# Patient Record
Sex: MALE
[De-identification: names, ages, dates, MRNs, and addresses within clinical notes are randomized per-mention and may not be internally consistent; named-entity substitution may affect disease eponyms.]

---

## 2020-10-05 ENCOUNTER — HOSPITAL ENCOUNTER (EMERGENCY)
Dept: HOSPITAL 88 - ER | Age: 40
LOS: 1 days | Discharge: HOME | End: 2020-10-06
Payer: SELF-PAY

## 2020-10-05 VITALS — WEIGHT: 230 LBS | BODY MASS INDEX: 32.93 KG/M2 | HEIGHT: 70 IN
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
## 2020-10-05 DIAGNOSIS — M54.5: Primary | ICD-10-CM

## 2020-10-05 PROCEDURE — 99283 EMERGENCY DEPT VISIT LOW MDM: CPT

## 2020-10-05 PROCEDURE — 72100 X-RAY EXAM L-S SPINE 2/3 VWS: CPT
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
## 2020-10-06 VITALS — DIASTOLIC BLOOD PRESSURE: 94 MMHG | SYSTOLIC BLOOD PRESSURE: 133 MMHG

## 2020-10-06 NOTE — DIAGNOSTIC IMAGING REPORT
Lumbar Spine Radiographs:   3 views    



HISTORY:  Pain.    

COMPARISON: None available.



DISCUSSION:

Some of the osseous structures are partially obscured by stool and bowel gas.



There are five non-rib bearing lumbar vertebral bodies.

The alignment of the spine is within normal limits.

No displaced fracture or compression deformity is identified.



Disc Spaces:

The disc spaces are well maintained.



Facets:

The facet joints are unremarkable.





IMPRESSION:

No acute radiographic abnormality.



Signed by: Stew Cantu MD on 10/6/2020 1:14 AM